# Patient Record
Sex: FEMALE | Race: WHITE | NOT HISPANIC OR LATINO | URBAN - NONMETROPOLITAN AREA
[De-identification: names, ages, dates, MRNs, and addresses within clinical notes are randomized per-mention and may not be internally consistent; named-entity substitution may affect disease eponyms.]

---

## 2019-09-24 ENCOUNTER — IMPORTED ENCOUNTER (OUTPATIENT)
Dept: URBAN - NONMETROPOLITAN AREA CLINIC 1 | Facility: CLINIC | Age: 84
End: 2019-09-24

## 2019-09-24 PROBLEM — Z98.890: Noted: 2019-09-24

## 2019-09-24 PROBLEM — H40.1132: Noted: 2019-09-24

## 2019-09-24 PROBLEM — H04.123: Noted: 2019-10-28

## 2019-09-24 PROBLEM — H35.3132: Noted: 2019-10-28

## 2019-09-24 PROBLEM — H43.812: Noted: 2019-09-24

## 2019-09-24 PROBLEM — H18.51: Noted: 2019-10-28

## 2019-09-24 PROBLEM — H35.3132: Noted: 2019-09-24

## 2019-09-24 PROBLEM — H18.51: Noted: 2019-09-24

## 2019-09-24 PROBLEM — H43.812: Noted: 2019-10-28

## 2019-09-24 PROBLEM — H04.123: Noted: 2019-09-24

## 2019-09-24 PROCEDURE — 92014 COMPRE OPH EXAM EST PT 1/>: CPT

## 2019-09-24 PROCEDURE — 92133 CPTRZD OPH DX IMG PST SGM ON: CPT

## 2019-09-24 NOTE — PATIENT DISCUSSION
POAG OU- Discussed diagnosis in detail with patient- Discussed signs and symptoms of progression advised to call or come in ASAP if any changes in vision noted from today- IOP today was rechecked at 17 OD and 21 OS but question compliance with drops. - Cup to Disc noted at 0.7 OD and 0.9 OS. - OCT done today:  Superior RNFL thinning noted. Superior and inferior RNFL thinning noted OS. Stable OU. - VF done in the past OD shows Inferior / Nasal step and OS shows Inferior. Cannot remember in future due to decreased cognitive status. - Restart Combigan BID OU- Restart Lumigan 0.01% OU- Continue to monitor closely- RTC 1 month for IOP check and MR by Candace OU- Discussed findings of exam in detail with the patient. - Discussed the chronic nature of this disease and limited treatment options. - Optos done in the past shows pigment clumping OD- OCT done today as well stable at this time. 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 Patient's son states that she has not been using any lubricating drops at all- Continue to monitorPVD OU- Discussed findings of exam in detail with the patient. - The risk of retinal detachment in patients with PVDs was discussed with the patient and the warning signs of retinal detachment were carefully reviewed with the patient. - The patient was warned to return to the office or contact the ophthalmologist on call immediately if they experience signs of retinal detachment. 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 Clau Wharton - Discussed diagnosis in detail with patient - Will call and speak to Dr. Clau Wharton and will call patient back today- Continue to monitor Presbyopia OU- Discussed diagnosis in detail with patient- Continue to monitor; 's Notes: OCT disc/mac  9/24/19VF 1/18/18Optos 5/17/17Gonio- PVM- 2/7/18MR - defers 9/24/19DFE 9/24/19

## 2019-10-28 ENCOUNTER — IMPORTED ENCOUNTER (OUTPATIENT)
Dept: URBAN - NONMETROPOLITAN AREA CLINIC 1 | Facility: CLINIC | Age: 84
End: 2019-10-28

## 2019-10-28 PROCEDURE — 99214 OFFICE O/P EST MOD 30 MIN: CPT

## 2019-10-28 PROCEDURE — 92015 DETERMINE REFRACTIVE STATE: CPT

## 2019-10-28 NOTE — PATIENT DISCUSSION
POAG OU- Discussed diagnosis in detail with patient- Discussed signs and symptoms of progression advised to call or come in ASAP if any changes in vision noted from today- IOP done today 15 OD and 17 OS better today than last visit. - Cup to Disc noted at 0.7 OD and 0.9 OS. - OCT done last visit:  Superior RNFL thinning noted. Superior and inferior RNFL thinning noted OS. Stable OU. - VF done in the past OD shows Inferior / Nasal step and OS shows Inferior. Cannot remember in future due to decreased cognitive status. - Continue Combigan BID OU- Continue Lumigan 0.01% OU- Continue to monitor closely- RTC 6 months f/u OCTARMD OU- Discussed findings of exam in detail with the patient. - Discussed the chronic nature of this disease and limited treatment options. - Optos done in the past shows pigment clumping OD- OCT done previously as well stable at this time. - Continue icaps daily- Continue Amsler Grid  to monitor Vision at home advised to call or come by office ASAP if any changes in vision are noted from today- Continue to monitorDES OU / Guttata OU- Discussed diagnosis in detail with patient- Discussed signs and symptoms in detail with patient- Recommend drinking plenty of water and starting [place] 3's- D/C Systane OU PRN- STRESSED Lubrication samples of Systane given today. Patient's son states that she has not been using any lubricating drops at all- Continue to monitorPVD OU- Discussed findings of exam in detail with the patient. - The risk of retinal detachment in patients with PVDs was discussed with the patient and the warning signs of retinal detachment were carefully reviewed with the patient. - The patient was warned to return to the office or contact the ophthalmologist on call immediately if they experience signs of retinal detachment. 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 Narayan Elliott - Discussed diagnosis in detail with patient - Will call and speak to Dr. Narayan Elliott and will call patient back today- Continue to monitor Presbyopia OU- Discussed diagnosis in detail with patient- Continue to monitor; Dr's Notes: MR  10/28/19 by Joyce Macias  9/24/19OCT disc/mac  9/24/19VF 1/18/18Optos 5/17/17Gonio- PVM- 2/7/18

## 2020-01-30 ENCOUNTER — IMPORTED ENCOUNTER (OUTPATIENT)
Dept: URBAN - NONMETROPOLITAN AREA CLINIC 1 | Facility: CLINIC | Age: 85
End: 2020-01-30

## 2020-01-30 PROBLEM — H40.1132: Noted: 2020-01-30

## 2020-01-30 PROBLEM — H18.51: Noted: 2020-01-30

## 2020-01-30 PROBLEM — H02.125: Noted: 2020-01-30

## 2020-01-30 PROBLEM — H35.3132: Noted: 2020-01-30

## 2020-01-30 PROBLEM — H43.812: Noted: 2020-01-30

## 2020-01-30 PROBLEM — H16.223: Noted: 2020-01-30

## 2020-01-30 PROCEDURE — 99213 OFFICE O/P EST LOW 20 MIN: CPT

## 2020-01-30 NOTE — PATIENT DISCUSSION
MARIA R OU / Guttata OU/ Ectropion LLL- Discussed diagnosis in detail with patient- Discussed signs and symptoms in detail with patient- Recommend drinking plenty of water and starting [place] 3's- D/C Systane OU PRN- STRESSED Lubrication through out the day as much as possible - Ectropion noted today LLL- Start Lotemax SM BID OS. Sample given today - Continue to monitor----------------------------previous notes-------------------------------POAG OU- Discussed diagnosis in detail with patient- Discussed signs and symptoms of progression advised to call or come in ASAP if any changes in vision noted from today- IOP done today 15 OD and 17 OS better today than last visit. - Cup to Disc noted at 0.7 OD and 0.9 OS. - OCT done last visit:  Superior RNFL thinning noted. Superior and inferior RNFL thinning noted OS. Stable OU. - VF done in the past OD shows Inferior / Nasal step and OS shows Inferior. Cannot remember in future due to decreased cognitive status. - Continue Combigan BID OU- Continue Lumigan 0.01% OU- Continue to monitor closely- RTC 6 months f/u OCTARMD OU- Discussed findings of exam in detail with the patient. - Discussed the chronic nature of this disease and limited treatment options. - Optos done in the past shows pigment clumping OD- OCT done previously as well stable at this time. - Continue icaps daily- Continue Amsler Grid  to monitor Vision at home advised to call or come by office ASAP if any changes in vision are noted from today- Continue to monitorPVD OU- Discussed findings of exam in detail with the patient. - The risk of retinal detachment in patients with PVDs was discussed with the patient and the warning signs of retinal detachment were carefully reviewed with the patient. - The patient was warned to return to the office or contact the ophthalmologist on call immediately if they experience signs of retinal detachment. 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 Stephenie Cortez - Discussed diagnosis in detail with patient - Will call and speak to Dr. Stephenie Cortez and will call patient back today- Continue to monitor Presbyopia OU- Discussed diagnosis in detail with patient- Continue to monitor; 's Notes: MR  10/28/19 by Ana Rosa Dennis  9/24/19OCT disc/mac  9/24/19VF 1/18/18Optos 5/17/17Gonio- PVM- 2/7/18

## 2020-05-29 ENCOUNTER — IMPORTED ENCOUNTER (OUTPATIENT)
Dept: URBAN - NONMETROPOLITAN AREA CLINIC 1 | Facility: CLINIC | Age: 85
End: 2020-05-29

## 2020-05-29 PROBLEM — H35.3132: Noted: 2020-01-30

## 2020-05-29 PROBLEM — H40.1132: Noted: 2020-05-29

## 2020-05-29 PROBLEM — H02.125: Noted: 2020-05-29

## 2020-05-29 PROBLEM — H43.812: Noted: 2020-01-30

## 2020-05-29 PROBLEM — H18.51: Noted: 2020-01-30

## 2020-05-29 PROBLEM — H16.223: Noted: 2020-05-29

## 2020-05-29 PROCEDURE — 99213 OFFICE O/P EST LOW 20 MIN: CPT

## 2020-05-29 NOTE — PATIENT DISCUSSION
POAG OU- Discussed diagnosis in detail with patient- Discussed signs and symptoms of progression advised to call or come in ASAP if any changes in vision noted from today- IOP done today 18 OD and 22 OS- Cup to Disc noted at 0.7 OD and 0.9 OS. - OCT done last visit:  Superior RNFL thinning noted. Superior and inferior RNFL thinning noted OS. Stable OU. - VF done in the past OD shows Inferior / Nasal step and OS shows Inferior. Cannot remember in future due to decreased cognitive status. - Continue Combigan BID OU- Continue Lumigan 0.01% OU- Continue to monitor closely- RTC 6 months MARIA R OU / Guttata OU/ Ectropion LLL- Discussed diagnosis in detail with patient- Discussed signs and symptoms in detail with patient- Recommend drinking plenty of water and starting [place] 3's- D/C Systane OU PRN- STRESSED Lubrication through out the day as much as possible - Ectropion noted today LLL- D/C Lotemax SM BID OS.- Continue to monitorARMD OU- Discussed findings of exam in detail with the patient. - Discussed the chronic nature of this disease and limited treatment options. - Optos done in the past shows pigment clumping OD- OCT done previously as well stable at this time. - Continue icaps daily- Continue Amsler Grid  to monitor Vision at home advised to call or come by office ASAP if any changes in vision are noted from today- Continue to monitorPVD OU- Discussed findings of exam in detail with the patient. - The risk of retinal detachment in patients with PVDs was discussed with the patient and the warning signs of retinal detachment were carefully reviewed with the patient. - The patient was warned to return to the office or contact the ophthalmologist on call immediately if they experience signs of retinal detachment. 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 DEE [facility] - Discussed diagnosis in detail with patient - Will call and speak to Dr. DEE [facility] and will call patient back today- Continue to monitor Presbyopia OU- Discussed diagnosis in detail with patient- new glasses RX given today - Continue to monitor; 's Notes: MR  10/28/19 by Renuka Griffith  9/24/19OCT disc/mac  9/24/19VF 1/18/18Optos 5/17/17Gochanell- PVM- 2/7/18

## 2020-09-25 ENCOUNTER — IMPORTED ENCOUNTER (OUTPATIENT)
Dept: URBAN - NONMETROPOLITAN AREA CLINIC 1 | Facility: CLINIC | Age: 85
End: 2020-09-25

## 2020-09-25 PROBLEM — H35.3132: Noted: 2020-09-25

## 2020-09-25 PROBLEM — H40.1132: Noted: 2020-09-25

## 2020-09-25 PROBLEM — H43.812: Noted: 2020-01-30

## 2020-09-25 PROBLEM — H16.223: Noted: 2020-09-25

## 2020-09-25 PROBLEM — H02.125: Noted: 2020-09-25

## 2020-09-25 PROBLEM — H18.51: Noted: 2020-01-30

## 2020-09-25 PROCEDURE — 92134 CPTRZ OPH DX IMG PST SGM RTA: CPT

## 2020-09-25 PROCEDURE — 99214 OFFICE O/P EST MOD 30 MIN: CPT

## 2020-09-25 NOTE — PATIENT DISCUSSION
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 - Discussed the chronic nature of this disease and limited treatment options. - Optos done previously shows pigment clumping OD- OCT done previously as well stable at this time. - Continue icaps daily- Continue Amsler Grid  to monitor Vision at home advised to call or come by office ASAP if any changes in vision are noted from today- Fluid noted today OS on dilated exam patient and patient's son does not wish to be seen by retinal consultant at this time - [place] has dropped off today explained to patient and son that ARMD has gotten worse since last visit - Continue to monitor- RTC A/S -----------------------------previous notes ----------------------------POAG OU- Discussed diagnosis in detail with patient- Discussed signs and symptoms of progression advised to call or come in ASAP if any changes in vision noted from today- IOP done today 18 OU- Cup to Disc noted at 0.7 OD and 0.9 OS. - OCT done last visit:  Superior RNFL thinning noted. Superior and inferior RNFL thinning noted OS. Stable OU. - VF done in the past OD shows Inferior / Nasal step and OS shows Inferior. Cannot remember in future due to decreased cognitive status. - Continue Combigan BID OU- Continue Lumigan 0.01% OU- Continue to monitor closely- RTC 6 months MARIA R OU / Guttata OU/ Ectropion LLL- Discussed diagnosis in detail with patient- Discussed signs and symptoms in detail with patient- Recommend drinking plenty of water and starting [place] 3's- D/C Systane OU PRN- STRESSED Lubrication through out the day as much as possible - Ectropion noted today LLL- D/C Lotemax SM BID OS.- Continue to monitorPVD OU- Discussed findings of exam in detail with the patient. - The risk of retinal detachment in patients with PVDs was discussed with the patient and the warning signs of retinal detachment were carefully reviewed with the patient. - The patient was warned to return to the office or contact the ophthalmologist on call immediately if they experience signs of retinal detachment. 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 Hawk Stevens - Discussed diagnosis in detail with patient - Will call and speak to Dr. Hawk Stevens and will call patient back today- Continue to monitor Presbyopia OU- Discussed diagnosis in detail with patient- new glasses RX given today - Continue to monitor; Dr's Notes: MR  10/28/19 by Kraig Herbert  9/24/19OCT disc/mac  9/24/19VF 1/18/18Optos 5/17/17Gonio- PVM- 2/7/18

## 2020-12-03 ENCOUNTER — IMPORTED ENCOUNTER (OUTPATIENT)
Dept: URBAN - NONMETROPOLITAN AREA CLINIC 1 | Facility: CLINIC | Age: 85
End: 2020-12-03

## 2020-12-03 PROCEDURE — 99213 OFFICE O/P EST LOW 20 MIN: CPT

## 2020-12-03 PROCEDURE — 92133 CPTRZD OPH DX IMG PST SGM ON: CPT

## 2020-12-03 NOTE — PATIENT DISCUSSION
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 - Discussed the chronic nature of this disease and limited treatment options. - Optos done previously shows pigment clumping OD- OCT done previously as well stable at this time. - Continue icaps daily- Continue Amsler Grid  to monitor Vision at home advised to call or come by office ASAP if any changes in vision are noted from today- Fluid noted today OS on dilated exam patient and patient's son does not wish to be seen by retinal consultant at this time - [place] has dropped off today explained to patient and son that ARMD has gotten worse since last visit - Continue to monitor- RTC A/S -----------------------------previous notes ----------------------------POAG OU- Discussed diagnosis in detail with patient- Discussed signs and symptoms of progression advised to call or come in ASAP if any changes in vision noted from today- IOP done today 18 OU- Cup to Disc noted at 0.7 OD and 0.9 OS. - OCT done today OD shows Borderline Superior RNFL thinning and OS UTO - VF done in the past OD shows Inferior / Nasal step and OS shows Inferior. Cannot remember in future due to decreased cognitive status. - Continue Combigan BID OU- Continue Lumigan 0.01% OU- Continue to monitor closely- Patient states that she does not want to come back unless she hurts states that she is 80years old and does not want to go through all of this but states that she will keep taking her drops. MARIA R OU / Guttata OU/ Ectropion LLL- Discussed diagnosis in detail with patient- Discussed signs and symptoms in detail with patient- Recommend drinking plenty of water and starting [place] 3's- D/C Systane OU PRN- STRESSED Lubrication through out the day as much as possible - Ectropion noted today LLL- D/C Lotemax SM BID OS.- Continue to monitorPVD OU- Discussed findings of exam in detail with the patient. - The risk of retinal detachment in patients with PVDs was discussed with the patient and the warning signs of retinal detachment were carefully reviewed with the patient. - The patient was warned to return to the office or contact the ophthalmologist on call immediately if they experience signs of retinal detachment. 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 Cassidy Raya - Discussed diagnosis in detail with patient - Will call and speak to Dr. Cassidy Raya and will call patient back today- Continue to monitor Presbyopia OU- Discussed diagnosis in detail with patient- new glasses RX given today - Continue to monitor; 's Notes: MR  10/28/19 by Margarette Phillips  9/24/19OCT disc/mac  9/24/19VF 1/18/18Optos 5/17/17Gonio- PVM- 2/7/18

## 2021-03-19 ENCOUNTER — IMPORTED ENCOUNTER (OUTPATIENT)
Dept: URBAN - NONMETROPOLITAN AREA CLINIC 1 | Facility: CLINIC | Age: 86
End: 2021-03-19

## 2021-03-19 PROCEDURE — 99213 OFFICE O/P EST LOW 20 MIN: CPT

## 2021-03-19 PROCEDURE — 92134 CPTRZ OPH DX IMG PST SGM RTA: CPT

## 2021-09-23 ENCOUNTER — IMPORTED ENCOUNTER (OUTPATIENT)
Dept: URBAN - NONMETROPOLITAN AREA CLINIC 1 | Facility: CLINIC | Age: 86
End: 2021-09-23

## 2021-09-23 PROBLEM — H40.1132: Noted: 2021-09-23

## 2021-09-23 PROBLEM — H43.812: Noted: 2020-01-30

## 2021-09-23 PROBLEM — H18.51: Noted: 2021-09-23

## 2021-09-23 PROBLEM — H02.125: Noted: 2021-09-23

## 2021-09-23 PROBLEM — H16.223: Noted: 2021-09-23

## 2021-09-23 PROBLEM — H35.3132: Noted: 2021-09-23

## 2021-09-23 PROCEDURE — 99213 OFFICE O/P EST LOW 20 MIN: CPT

## 2021-09-23 NOTE — PATIENT DISCUSSION
MARIA R OU / Guttata OU/ Ectropion LLL- Discussed diagnosis in detail with patient and daughter- Discussed signs and symptoms in detail with patient- Recommend drinking plenty of water and starting [place] 3's- D/C Systane OU PRN- STRESSED Lubrication through out the day as much as possible - Ectropion noted today LLL- D/C Lotemax SM BID OS.- Start FML TID OS x 3-4 days then start tapering RX sent to pharmacy - Samples of Refresh Gel given today touse at bedtime - Continue to monitorPREVIOUS NOTES ARMD OU- Discussed findings of exam in detail with the patient. - Discussed the chronic nature of this disease and limited treatment options. - Optos done previously shows pigment clumping OD- OCT done today OD shows drusen and progression. OS UTO  - Continue icaps daily- Continue Amsler Grid  to monitor Vision at home advised to call or come by office ASAP if any changes in vision are noted from today- Fluid noted at previously visit OS on dilated exam patient and patient's son does not wish to be seen by retinal consultant at this time - [place] has dropped off today explained to patient and son that ARMD has gotten worse since last visit - Continue to monitor- RTC A/S POAG OU- Discussed diagnosis in detail with patient- Discussed signs and symptoms of progression advised to call or come in ASAP if any changes in vision noted from today- IOP done today 18 OU- Cup to Disc noted at 0.7 OD and 0.9 OS. - OCT done today OD shows Borderline Superior RNFL thinning and OS UTO - VF done in the past OD shows Inferior / Nasal step and OS shows Inferior. Cannot remember in future due to decreased cognitive status. - Continue Combigan BID OU- Continue Lumigan 0.01% OU- Continue to monitor closely- Patient states that she does not want to come back unless she hurts states that she is 80years old and does not want to go through all of this but states that she will keep taking her drops. PVD OU- Discussed findings of exam in detail with the patient. - The risk of retinal detachment in patients with PVDs was discussed with the patient and the warning signs of retinal detachment were carefully reviewed with the patient. - The patient was warned to return to the office or contact the ophthalmologist on call immediately if they experience signs of retinal detachment. - Continue to monitorPseudophakia OU with PCO OD - Discussed diagnosis in detail with patient- Trace PCO noted OD but stable and no treatment needed at this time - Continue to monitorHx of SLT OS w/ Dr. Isaura Crane - Discussed diagnosis in detail with patient - Will call and speak to Dr. Isaura Crane and will call patient back today- Continue to monitor Presbyopia OU- Discussed diagnosis in detail with patient- new glasses RX given today - Continue to monitor; Dr's Notes: MR  10/28/19 by Renuka Grfifith  9/24/19OCT disc/mac  3/19/21VF 1/18/18Optos 5/17/17Gonio- PVM- 2/7/18

## 2021-10-22 ENCOUNTER — IMPORTED ENCOUNTER (OUTPATIENT)
Dept: URBAN - NONMETROPOLITAN AREA CLINIC 1 | Facility: CLINIC | Age: 86
End: 2021-10-22

## 2021-10-22 PROBLEM — H35.3132: Noted: 2021-10-22

## 2021-10-22 PROBLEM — H40.1132: Noted: 2021-10-22

## 2021-10-22 PROBLEM — H16.223: Noted: 2021-10-22

## 2021-10-22 PROBLEM — H18.51: Noted: 2021-10-22

## 2021-10-22 PROBLEM — H43.812: Noted: 2020-01-30

## 2021-10-22 PROBLEM — H02.125: Noted: 2021-10-22

## 2021-10-22 PROBLEM — H18.519: Noted: 2021-10-22

## 2021-10-22 PROBLEM — H18.513: Noted: 2021-10-22

## 2021-10-22 PROCEDURE — 99213 OFFICE O/P EST LOW 20 MIN: CPT

## 2021-10-22 NOTE — PATIENT DISCUSSION
POAG OU- Discussed diagnosis in detail with patient- Discussed signs and symptoms of progression advised to call or come in ASAP if any changes in vision noted from today- IOP today OD 14 and OS 36 last visit was 18 OU- Cup to Disc noted at 0.7 OD and 0.9 OS. - OCT done today OD shows Borderline Superior RNFL thinning and OS UTO - VF done in the past OD shows Inferior / Nasal step and OS shows Inferior. Cannot remember in future due to decreased cognitive status. - Explained that due to higher IOP she is having pain/discomfort- Start FML OS TID Rx sent to [address] [place] [address]. - Consider doing Mercedes ointment at bedtime daily in future to help this inflammation process- Continue Combigan BID OU- Continue Lumigan 0.01% OU- Continue to monitor closely- Patient states that she does not want to come back unless she hurts states that she is 80years old and does not want to go through all of this but states that she will keep taking her drops. - Daughter/family is to call me tomorrow and let me know how the patient is doing. Gave her my cell phone number.- Monitor PRN or a/s MARIA R OU / Guttata OU/ Ectropion LLL- Discussed diagnosis in detail with patient and daughter- Discussed signs and symptoms in detail with patient- Recommend drinking plenty of water and starting [place] 3's- D/C Systane OU PRN- STRESSED Lubrication through out the day as much as possible - Ectropion noted today LLL- Hx of Lotemax SM BID OS.- Samples of Refresh Gel given today touse at bedtime - Continue to monitor----------------------------------------------------------------------ARMD OU- Discussed findings of exam in detail with the patient. - Discussed the chronic nature of this disease and limited treatment options. - Optos done previously shows pigment clumping OD- OCT done today OD shows drusen and progression. OS UTO  - Continue icaps daily- Continue Amsler Grid  to monitor Vision at home advised to call or come by office ASAP if any changes in vision are noted from today- Fluid noted at previously visit OS on dilated exam patient and patient's son does not wish to be seen by retinal consultant at this time - [place] has dropped off today explained to patient and son that ARMD has gotten worse since last visit - Continue to monitor- RTC A/S PVD OU- Discussed findings of exam in detail with the patient. - The risk of retinal detachment in patients with PVDs was discussed with the patient and the warning signs of retinal detachment were carefully reviewed with the patient. - The patient was warned to return to the office or contact the ophthalmologist on call immediately if they experience signs of retinal detachment. - Continue to monitorPseudophakia OU with PCO OD - Discussed diagnosis in detail with patient- Trace PCO noted OD but stable and no treatment needed at this time - Continue to monitorHx of SLT OS w/ Dr. Michaelle Duran - Discussed diagnosis in detail with patient - Will call and speak to Dr. Michaelle Duran and will call patient back today- Continue to monitor Presbyopia OU- Discussed diagnosis in detail with patient- new glasses RX given today - Continue to monitor; 's Notes: MR  10/28/19 by Margarette Zhu  9/24/19OCT disc/mac  3/19/21VF 1/18/18Optos 5/17/17Gonio- PVM- 2/7/18

## 2021-10-25 ENCOUNTER — IMPORTED ENCOUNTER (OUTPATIENT)
Dept: URBAN - NONMETROPOLITAN AREA CLINIC 1 | Facility: CLINIC | Age: 86
End: 2021-10-25

## 2021-10-25 PROCEDURE — 92071 CONTACT LENS FITTING FOR TX: CPT

## 2021-10-25 PROCEDURE — 99213 OFFICE O/P EST LOW 20 MIN: CPT

## 2021-10-25 NOTE — PATIENT DISCUSSION
POAG OU- Discussed diagnosis in detail with patient- Discussed signs and symptoms of progression advised to call or come in ASAP if any changes in vision noted from today- IOP today OD 23 and OS 53- Cup to Disc noted at 0.7 OD and 0.9 OS. - OCT done previously OD shows Borderline Superior RNFL thinning and OS UTO - VF done in the past OD shows Inferior / Nasal step and OS shows Inferior. Cannot remember in future due to decreased cognitive status. - Explained that due to higher IOP she is having pain/discomfort- D/C  FML OS TID Rx sent to [address] [place] [address]. - Consider doing Mercedes ointment at bedtime daily in future to help this inflammation process- Continue Combigan BID OU- Continue Lumigan 0.01% QHS OU- Start Prolensa BID OS  sample given today - BCL inserted today in OS Dailies Total 1 8.5 / +0.50 / [id number] / exp date 05/2023- Continue to monitor - RTC Thursday for F/UDES OU / Guttata OU/ Ectropion LLL- Discussed diagnosis in detail with patient and daughter- Discussed signs and symptoms in detail with patient- Recommend drinking plenty of water and starting [place] 3's- D/C Systane OU PRN- STRESSED Lubrication through out the day as much as possible - Ectropion noted today LLL- Hx of Lotemax SM BID OS.- Samples of Refresh Gel given today touse at bedtime - Continue to monitor----------------------------------------------------------------------ARMD OU- Discussed findings of exam in detail with the patient. - Discussed the chronic nature of this disease and limited treatment options. - Optos done previously shows pigment clumping OD- OCT done today OD shows drusen and progression. OS UTO  - Continue icaps daily- Continue Amsler Grid  to monitor Vision at home advised to call or come by office ASAP if any changes in vision are noted from today- Fluid noted at previously visit OS on dilated exam patient and patient's son does not wish to be seen by retinal consultant at this time - [place] has dropped off today explained to patient and son that ARMD has gotten worse since last visit - Continue to monitor- RTC A/S PVD OU- Discussed findings of exam in detail with the patient. - The risk of retinal detachment in patients with PVDs was discussed with the patient and the warning signs of retinal detachment were carefully reviewed with the patient. - The patient was warned to return to the office or contact the ophthalmologist on call immediately if they experience signs of retinal detachment. - Continue to monitorPseudophakia OU with PCO OD - Discussed diagnosis in detail with patient- Trace PCO noted OD but stable and no treatment needed at this time - Continue to monitorHx of SLT OS w/ Dr. Nicole Bearden - Discussed diagnosis in detail with patient - Will call and speak to Dr. Nicole Bearden and will call patient back today- Continue to monitor Presbyopia OU- Discussed diagnosis in detail with patient- new glasses RX given today - Continue to monitor; Dr's Notes: MR  10/28/19 by Kae Russ  9/24/19OCT disc/mac  3/19/21VF 1/18/18Optos 5/17/17Gonio- PVM- 2/7/18

## 2021-10-28 ENCOUNTER — IMPORTED ENCOUNTER (OUTPATIENT)
Dept: URBAN - NONMETROPOLITAN AREA CLINIC 1 | Facility: CLINIC | Age: 86
End: 2021-10-28

## 2021-10-28 PROCEDURE — 99213 OFFICE O/P EST LOW 20 MIN: CPT

## 2021-10-28 NOTE — PATIENT DISCUSSION
POAG OU- Discussed diagnosis in detail with patient- Discussed signs and symptoms of progression advised to call or come in ASAP if any changes in vision noted from today- IOP today OD 18 and OS 32 shashi today - Cup to Disc noted at 0.7 OD and 0.9 OS. - OCT done previously OD shows Borderline Superior RNFL thinning and OS UTO - VF done in the past OD shows Inferior / Nasal step and OS shows Inferior. Cannot remember in future due to decreased cognitive status. - Explained that due to higher IOP she is having pain/discomfort- D/C  FML OS TID Rx sent to [address] [place] [address]. - Consider doing Mercedes ointment at bedtime daily in future to help this inflammation process- Continue Combigan BID OU- Continue Lumigan 0.01% QHS OU- Continue Prolensa BID OS - Will not remove BCL today in OS Dailies Total 1 8.5 / +0.50 / [id number] / exp date 05/2023- Continue to monitor - RTC 1 week PREVIOUS NOTES MARIA R OU / Guttata OU/ Ectropion LLL- Discussed diagnosis in detail with patient and daughter- Discussed signs and symptoms in detail with patient- Recommend drinking plenty of water and starting [place] 3's- D/C Systane OU PRN- STRESSED Lubrication through out the day as much as possible - Ectropion noted today LLL- Hx of Lotemax SM BID OS.- Samples of Refresh Gel given today touse at bedtime - Continue to monitor----------------------------------------------------------------------ARMD OU- Discussed findings of exam in detail with the patient. - Discussed the chronic nature of this disease and limited treatment options. - Optos done previously shows pigment clumping OD- OCT done today OD shows drusen and progression. OS UTO  - Continue icaps daily- Continue Amsler Grid  to monitor Vision at home advised to call or come by office ASAP if any changes in vision are noted from today- Fluid noted at previously visit OS on dilated exam patient and patient's son does not wish to be seen by retinal consultant at this time - [place] has dropped off today explained to patient and son that ARMD has gotten worse since last visit - Continue to monitor- RTC A/S PVD OU- Discussed findings of exam in detail with the patient. - The risk of retinal detachment in patients with PVDs was discussed with the patient and the warning signs of retinal detachment were carefully reviewed with the patient. - The patient was warned to return to the office or contact the ophthalmologist on call immediately if they experience signs of retinal detachment. - Continue to monitorPseudophakia OU with PCO OD - Discussed diagnosis in detail with patient- Trace PCO noted OD but stable and no treatment needed at this time - Continue to monitorHx of SLT OS w/ Dr. Harmony Calzada - Discussed diagnosis in detail with patient - Will call and speak to Dr. Harmony Calzada and will call patient back today- Continue to monitor Presbyopia OU- Discussed diagnosis in detail with patient- new glasses RX given today - Continue to monitor; Dr's Notes: MR  10/28/19 by Adelina Cartwright  9/24/19OCT disc/mac  3/19/21VF 1/18/18Optos 5/17/17Gonio- PVM- 2/7/18

## 2021-11-04 ENCOUNTER — IMPORTED ENCOUNTER (OUTPATIENT)
Dept: URBAN - NONMETROPOLITAN AREA CLINIC 1 | Facility: CLINIC | Age: 86
End: 2021-11-04

## 2021-11-04 PROCEDURE — 99213 OFFICE O/P EST LOW 20 MIN: CPT

## 2021-11-04 NOTE — PATIENT DISCUSSION
POAG OU- Discussed diagnosis in detail with patient- Discussed signs and symptoms of progression advised to call or come in ASAP if any changes in vision noted from today- IOP today OD 14 and OS 28 shashi today - Cup to Disc noted at 0.7 OD and 0.9 OS. - OCT done previously OD shows Borderline Superior RNFL thinning and OS UTO - VF done in the past OD shows Inferior / Nasal step and OS shows Inferior. Cannot remember in future due to decreased cognitive status. - Explained that due to higher IOP she is having pain/discomfort- Consider doing Mercedes ointment at bedtime daily in future to help this inflammation process- D/C  FML OS TID- Continue Combigan BID OU- Continue Lumigan 0.01% QHS OU- Continue Prolensa BID OS until gone then switch to Ketorolac BID - TID RX sent to pharmacy  - Start Refresh Gel at bedtime - Remove BCL today will not replace today - Continue to monitor PREVIOUS NOTES MARIA R OU / Guttata OU/ Ectropion LLL- Discussed diagnosis in detail with patient and daughter- Discussed signs and symptoms in detail with patient- Recommend drinking plenty of water and starting [place] 3's- D/C Systane OU PRN- STRESSED Lubrication through out the day as much as possible - Ectropion noted today LLL- Hx of Lotemax SM BID OS.- Samples of Refresh Gel given today touse at bedtime - Continue to monitor----------------------------------------------------------------------ARMD OU- Discussed findings of exam in detail with the patient. - Discussed the chronic nature of this disease and limited treatment options. - Optos done previously shows pigment clumping OD- OCT done today OD shows drusen and progression. OS UTO  - Continue icaps daily- Continue Amsler Grid  to monitor Vision at home advised to call or come by office ASAP if any changes in vision are noted from today- Fluid noted at previously visit OS on dilated exam patient and patient's son does not wish to be seen by retinal consultant at this time - [place] has dropped off today explained to patient and son that ARMD has gotten worse since last visit - Continue to monitor- RTC A/S PVD OU- Discussed findings of exam in detail with the patient. - The risk of retinal detachment in patients with PVDs was discussed with the patient and the warning signs of retinal detachment were carefully reviewed with the patient. - The patient was warned to return to the office or contact the ophthalmologist on call immediately if they experience signs of retinal detachment. - Continue to monitorPseudophakia OU with PCO OD - Discussed diagnosis in detail with patient- Trace PCO noted OD but stable and no treatment needed at this time - Continue to monitorHx of SLT OS w/ Dr. Cristiana Gilbert - Discussed diagnosis in detail with patient - Will call and speak to Dr. Cristiana Gilbert and will call patient back today- Continue to monitor Presbyopia OU- Discussed diagnosis in detail with patient- new glasses RX given today - Continue to monitor; Dr's Notes: MR  10/28/19 by Shea Nguyen  9/24/19OCT disc/mac  3/19/21VF 1/18/18Optos 5/17/17Gonio- PVM- 2/7/18

## 2022-04-09 ASSESSMENT — TONOMETRY
OS_IOP_MMHG: 22
OS_IOP_MMHG: 21
OD_IOP_MMHG: 21
OS_IOP_MMHG: 28
OS_IOP_MMHG: 18
OS_IOP_MMHG: 36
OD_IOP_MMHG: 18
OD_IOP_MMHG: 18
OS_IOP_MMHG: 17
OD_IOP_MMHG: 18
OD_IOP_MMHG: 18
OD_IOP_MMHG: 14
OS_IOP_MMHG: 14
OS_IOP_MMHG: 18
OS_IOP_MMHG: 29
OD_IOP_MMHG: 14
OS_IOP_MMHG: 39
OD_IOP_MMHG: 17
OD_IOP_MMHG: 16
OD_IOP_MMHG: 18
OS_IOP_MMHG: 18
OD_IOP_MMHG: 23
OD_IOP_MMHG: 14
OS_IOP_MMHG: 32
OS_IOP_MMHG: 53
OD_IOP_MMHG: 15

## 2022-04-09 ASSESSMENT — VISUAL ACUITY
OD_SC: 20/80
OD_CC: 20/80+
OD_SC: CF4'
OS_SC: 20/LP
OD_SC: 20/200
OD_SC: 20/100-1
OD_SC: 20/200
OD_SC: 20/125-
OD_SC: CF4'
OD_SC: 20/60-2
OD_SC: CF4'

## 2022-04-09 ASSESSMENT — PACHYMETRY
OD_CT_UM: ADJ: 595

## 2022-04-19 ENCOUNTER — EMERGENCY VISIT (OUTPATIENT)
Dept: URBAN - NONMETROPOLITAN AREA CLINIC 1 | Facility: CLINIC | Age: 87
End: 2022-04-19

## 2022-04-19 DIAGNOSIS — H18.232: ICD-10-CM

## 2022-04-19 PROCEDURE — 99213 OFFICE O/P EST LOW 20 MIN: CPT

## 2022-04-19 RX ORDER — PREDNISOLONE ACETATE 10 MG/ML: 1 SUSPENSION/ DROPS OPHTHALMIC

## 2022-04-19 NOTE — PATIENT DISCUSSION
Discussed diagnosis in detail with care giver and would like for son to call me to discuss treatment plan. Start Pred QID OS, RX sent to pharmacy. Continue to monitor. Stop Ketorolac.

## 2022-04-19 NOTE — PATIENT DISCUSSION
POAG OU- Discussed diagnosis in detail with patient- Discussed signs and symptoms of progression advised to call or come in ASAP if any changes in vision noted from today- IOP today OD 14 and OS 28 shashi today - Cup to Disc noted at 0.7 OD and 0.9 OS. - OCT done previously OD shows Borderline Superior RNFL thinning and OS UTO - VF done in the past OD shows Inferior / Nasal step and OS shows Inferior. Cannot remember in future due to decreased cognitive status. - Explained that due to higher IOP she is having pain/discomfort- Consider doing Mercedes ointment at bedtime daily in future to help this inflammation process- D/C  FML OS TID- Continue Combigan BID OU- Continue Lumigan 0.01% QHS OU- Continue Prolensa BID OS until gone then switch to Ketorolac BID - TID RX sent to pharmacy  - Start Refresh Gel at bedtime - Remove BCL today will not replace today - Continue to monitor PREVIOUS NOTES MARIA R OU / Guttata OU/ Ectropion LLL- Discussed diagnosis in detail with patient and daughter- Discussed signs and symptoms in detail with patient- Recommend drinking plenty of water and starting [place] 3's- D/C Systane OU PRN- STRESSED Lubrication through out the day as much as possible - Ectropion noted today LLL- Hx of Lotemax SM BID OS.- Samples of Refresh Gel given today touse at bedtime - Continue to monitor----------------------------------------------------------------------ARMD OU- Discussed findings of exam in detail with the patient. - Discussed the chronic nature of this disease and limited treatment options. - Optos done previously shows pigment clumping OD- OCT done today OD shows drusen and progression. OS UTO  - Continue icaps daily- Continue Amsler Grid  to monitor Vision at home advised to call or come by office ASAP if any changes in vision are noted from today- Fluid noted at previously visit OS on dilated exam patient and patient's son does not wish to be seen by retinal consultant at this time - [place] has dropped off today explained to patient and son that ARMD has gotten worse since last visit - Continue to monitor- RTC A/S PVD OU- Discussed findings of exam in detail with the patient. - The risk of retinal detachment in patients with PVDs was discussed with the patient and the warning signs of retinal detachment were carefully reviewed with the patient. - The patient was warned to return to the office or contact the ophthalmologist on call immediately if they experience signs of retinal detachment. - Continue to monitorPseudophakia OU with PCO OD - Discussed diagnosis in detail with patient- Trace PCO noted OD but stable and no treatment needed at this time - Continue to monitorHx of SLT OS w/ Dr. Clau Wharton - Discussed diagnosis in detail with patient - Will call and speak to Dr. Clau Wharton and will call patient back today- Continue to monitor Presbyopia OU- Discussed diagnosis in detail with patient- new glasses RX given today - Continue to monitor; 's Notes: MR  10/28/19 by Valentino Key  9/24/19OCT disc/mac  3/19/21VF 1/18/18Optos 5/17/17Gonio- PVM- 2/7/18.

## 2022-04-27 ENCOUNTER — FOLLOW UP (OUTPATIENT)
Dept: URBAN - NONMETROPOLITAN AREA CLINIC 1 | Facility: CLINIC | Age: 87
End: 2022-04-27

## 2022-04-27 DIAGNOSIS — H18.232: ICD-10-CM

## 2022-04-27 PROCEDURE — 99213 OFFICE O/P EST LOW 20 MIN: CPT

## 2022-04-27 ASSESSMENT — VISUAL ACUITY: OD_SC: CF 3FT

## 2022-04-27 NOTE — PATIENT DISCUSSION
POAG OU- Discussed diagnosis in detail with patient- Discussed signs and symptoms of progression advised to call or come in ASAP if any changes in vision noted from today- IOP today OD 14 and OS 28 shashi today - Cup to Disc noted at 0.7 OD and 0.9 OS. - OCT done previously OD shows Borderline Superior RNFL thinning and OS UTO - VF done in the past OD shows Inferior / Nasal step and OS shows Inferior. Cannot remember in future due to decreased cognitive status. - Explained that due to higher IOP she is having pain/discomfort- Consider doing Mercedes ointment at bedtime daily in future to help this inflammation process- D/C  FML OS TID- Continue Combigan BID OU- Continue Lumigan 0.01% QHS OU- Continue Prolensa BID OS until gone then switch to Ketorolac BID - TID RX sent to pharmacy  - Start Refresh Gel at bedtime - Remove BCL today will not replace today - Continue to monitor PREVIOUS NOTES MARIA R OU / Guttata OU/ Ectropion LLL- Discussed diagnosis in detail with patient and daughter- Discussed signs and symptoms in detail with patient- Recommend drinking plenty of water and starting [place] 3's- D/C Systane OU PRN- STRESSED Lubrication through out the day as much as possible - Ectropion noted today LLL- Hx of Lotemax SM BID OS.- Samples of Refresh Gel given today touse at bedtime - Continue to monitor----------------------------------------------------------------------ARMD OU- Discussed findings of exam in detail with the patient. - Discussed the chronic nature of this disease and limited treatment options. - Optos done previously shows pigment clumping OD- OCT done today OD shows drusen and progression. OS UTO  - Continue icaps daily- Continue Amsler Grid  to monitor Vision at home advised to call or come by office ASAP if any changes in vision are noted from today- Fluid noted at previously visit OS on dilated exam patient and patient's son does not wish to be seen by retinal consultant at this time - [place] has dropped off today explained to patient and son that ARMD has gotten worse since last visit - Continue to monitor- RTC A/S PVD OU- Discussed findings of exam in detail with the patient. - The risk of retinal detachment in patients with PVDs was discussed with the patient and the warning signs of retinal detachment were carefully reviewed with the patient. - The patient was warned to return to the office or contact the ophthalmologist on call immediately if they experience signs of retinal detachment. - Continue to monitorPseudophakia OU with PCO OD - Discussed diagnosis in detail with patient- Trace PCO noted OD but stable and no treatment needed at this time - Continue to monitorHx of SLT OS w/ Dr. Justine Ba - Discussed diagnosis in detail with patient - Will call and speak to Dr. Justine Ba and will call patient back today- Continue to monitor Presbyopia OU- Discussed diagnosis in detail with patient- new glasses RX given today - Continue to monitor; Dr's Notes: MR  10/28/19 by Eileen Olivas  9/24/19OCT disc/mac  3/19/21VF 1/18/18Optos 5/17/17Gonio- PVM- 2/7/18.

## 2022-04-27 NOTE — PATIENT DISCUSSION
Discussed diagnosis in detail with care giver. Continue Pred BID OS, RX sent to pharmacy. Continue to monitor. Stop Ketorolac.

## 2022-08-24 ENCOUNTER — FOLLOW UP (OUTPATIENT)
Dept: URBAN - NONMETROPOLITAN AREA CLINIC 1 | Facility: CLINIC | Age: 87
End: 2022-08-24

## 2022-08-24 DIAGNOSIS — H16.223: ICD-10-CM

## 2022-08-24 PROCEDURE — 99213 OFFICE O/P EST LOW 20 MIN: CPT

## 2022-08-24 ASSESSMENT — TONOMETRY: OD_IOP_MMHG: 18

## 2022-08-24 ASSESSMENT — VISUAL ACUITY: OD_SC: CF 3FT

## 2022-08-24 NOTE — PATIENT DISCUSSION
POAG OU- Discussed diagnosis in detail with patient- Discussed signs and symptoms of progression advised to call or come in ASAP if any changes in vision noted from today- IOP today OD 14 and OS 28 shashi today - Cup to Disc noted at 0.7 OD and 0.9 OS. - OCT done previously OD shows Borderline Superior RNFL thinning and OS UTO - VF done in the past OD shows Inferior / Nasal step and OS shows Inferior. Cannot remember in future due to decreased cognitive status. - Explained that due to higher IOP she is having pain/discomfort- Consider doing Mercedes ointment at bedtime daily in future to help this inflammation process- D/C  FML OS TID- Continue Combigan BID OU- Continue Lumigan 0.01% QHS OU- Continue Prolensa BID OS until gone then switch to Ketorolac BID - TID RX sent to pharmacy  - Start Refresh Gel at bedtime - Remove BCL today will not replace today - Continue to monitor PREVIOUS NOTES MARIA R OU / Guttata OU/ Ectropion LLL- Discussed diagnosis in detail with patient and daughter- Discussed signs and symptoms in detail with patient- Recommend drinking plenty of water and starting [place] 3's- D/C Systane OU PRN- STRESSED Lubrication through out the day as much as possible - Ectropion noted today LLL- Hx of Lotemax SM BID OS.- Samples of Refresh Gel given today touse at bedtime - Continue to monitor----------------------------------------------------------------------ARMD OU- Discussed findings of exam in detail with the patient. - Discussed the chronic nature of this disease and limited treatment options. - Optos done previously shows pigment clumping OD- OCT done today OD shows drusen and progression. OS UTO  - Continue icaps daily- Continue Amsler Grid  to monitor Vision at home advised to call or come by office ASAP if any changes in vision are noted from today- Fluid noted at previously visit OS on dilated exam patient and patient's son does not wish to be seen by retinal consultant at this time - [address] has dropped off today explained to patient and son that ARMD has gotten worse since last visit - Continue to monitor- RTC A/S PVD OU- Discussed findings of exam in detail with the patient. - The risk of retinal detachment in patients with PVDs was discussed with the patient and the warning signs of retinal detachment were carefully reviewed with the patient. - The patient was warned to return to the office or contact the ophthalmologist on call immediately if they experience signs of retinal detachment. - Continue to monitorPseudophakia OU with PCO OD - Discussed diagnosis in detail with patient- Trace PCO noted OD but stable and no treatment needed at this time - Continue to monitorHx of SLT OS w/ Dr. Harmony Calzada - Discussed diagnosis in detail with patient - Will call and speak to Dr. Harmony Calzada and will call patient back today- Continue to monitor Presbyopia OU- Discussed diagnosis in detail with patient- new glasses RX given today - Continue to monitor; 's Notes: MR  10/28/19 by Wyckoff Amel  9/24/19OCT disc/mac  3/19/21VF 1/18/18Optos 5/17/17Gonio- PVM- 2/7/18.

## 2022-08-24 NOTE — PATIENT DISCUSSION
Family to call if they have additional questions/concerns with her eyes instead of coming in to be seen due to decreased cognitive status.

## 2022-08-24 NOTE — PATIENT DISCUSSION
(Observe) The patient has an out-turned (RUL/RLL/LÁZARO/LLL). The patient is not experiencing symptoms, nor is there severe keratitis, therefore observation was recommended.